# Patient Record
Sex: FEMALE
[De-identification: names, ages, dates, MRNs, and addresses within clinical notes are randomized per-mention and may not be internally consistent; named-entity substitution may affect disease eponyms.]

---

## 2018-01-09 ENCOUNTER — HOSPITAL ENCOUNTER (EMERGENCY)
Dept: HOSPITAL 14 - H.ER | Age: 23
Discharge: HOME | End: 2018-01-09
Payer: COMMERCIAL

## 2018-01-09 VITALS
TEMPERATURE: 96.7 F | DIASTOLIC BLOOD PRESSURE: 83 MMHG | OXYGEN SATURATION: 100 % | RESPIRATION RATE: 16 BRPM | SYSTOLIC BLOOD PRESSURE: 138 MMHG | HEART RATE: 60 BPM
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
## 2018-01-09 DIAGNOSIS — S09.0XXA: Primary | ICD-10-CM

## 2018-01-09 NOTE — ED PDOC
HPI:  Headache


Time Seen by Provider: 01/09/18 20:14


Chief Complaint (Nursing): Headache


Chief Complaint (Provider): headache


History Per: Patient


History/Exam Limitations: no limitations


Onset/Duration Of Symptoms: Days (1)


Current Symptoms Are (Timing): Still Present


Quality: Other (throbbing)


Additional History Per: Patient


Additional Complaint(s): 





23 y/o female presents with headache x 24 hours.  Patient states she was 

wearing special shoes for Spin class and when she walked on the tile she 

slipped and landed right on her buttocks.  Patient states she did not hit her 

head but felt it "rattle" when she hit, and notes generalized headaches since 

then.  Patient has been taking Tylenol every 4 hours without improvement of 

headache, and notes increased fatigue today.  Denies fever, dizziness, vision 

changes, neck/back pain, nausea/vomiting, extremity numbness/weakness. 





Past Medical History


Reviewed: Historical Data, Nursing Documentation, Vital Signs


Vital Signs: 





 Last Vital Signs











Temp  96.7 F L  01/09/18 19:49


 


Pulse  60   01/09/18 19:49


 


Resp  16   01/09/18 19:49


 


BP  138/83   01/09/18 19:49


 


Pulse Ox  100   01/09/18 19:49














- Medical History


PMH: No Chronic Diseases





- Surgical History


Surgical History: No Surg Hx





- Family History


Family History: States: No Known Family Hx





- Home Medications


Home Medications: 


 Ambulatory Orders











 Medication  Instructions  Recorded


 


Ibuprofen [Motrin Tab] 1 tab PO Q6 PRN #20 tab 01/09/18














- Allergies


Allergies/Adverse Reactions: 


 Allergies











Allergy/AdvReac Type Severity Reaction Status Date / Time


 


No Known Allergies Allergy   Verified 01/09/18 19:49














Review of Systems


ROS Statement: Except As Marked, All Systems Reviewed And Found Negative


Neurological: Positive for: Headache





Physical Exam





- Reviewed


Nursing Documentation Reviewed: Yes


Vital Signs Reviewed: Yes





- Physical Exam


Appears: Positive for: Well, Non-toxic, No Acute Distress


Head Exam: Positive for: ATRAUMATIC, NORMAL INSPECTION, NORMOCEPHALIC


Skin: Positive for: Normal Color


Eye Exam: Positive for: Normal appearance, EOMI, PERRL


ENT: Positive for: Normal ENT Inspection


Cardiovascular/Chest: Positive for: Regular Rate, Rhythm


Respiratory: Positive for: Normal Breath Sounds


Gastrointestinal/Abdominal: Positive for: Normal Exam


Back: Positive for: Normal Inspection


Extremity: Positive for: Normal ROM


Neurologic/Psych: Positive for: Alert, Oriented.  Negative for: Motor/Sensory 

Deficits





- ECG


O2 Sat by Pulse Oximetry: 100





- Progress


ED Course And Treament: 








EXAM:


CT Head Without Intravenous Contrast


EXAM DATE/TIME:


Exam ordered 1/9/2018 8:38 PM


CLINICAL HISTORY:


22 years old, female; Pain; Headache; Headache not specified; Additional info: 

Fall, headache


TECHNIQUE:


Axial computed tomography images of the head/brain without intravenous 

contrast. All CT scans at


this facility use one or more dose reduction techniques, viz.: automated 

exposure control; ma/kV


adjustment per patient size (including targeted exams where dose is matched to 

indication; i.e. head);


or iterative reconstruction technique.


Coronal and sagittal reformatted images were created and reviewed.


COMPARISON:


No relevant prior studies available.


FINDINGS:


Brain: Unremarkable. No hemorrhage. No significant white matter disease. No 

edema.


Ventricles: Unremarkable. No ventriculomegaly.


Bones/joints: Unremarkable. No acute fracture.


Soft tissues: Unremarkable.


Sinuses: Unremarkable as visualized. No acute sinusitis.


Mastoid air cells: Unremarkable as visualized. No mastoid effusion.


IMPRESSION:


Negative noncontrast head CT.








Patient educated on findings, discharged with rx Ibuprofen.


Advised follow up PMD 2-3 days.


Return precautions given.





Disposition





- Clinical Impression


Clinical Impression: 


 Headache








- Patient ED Disposition


Is Patient to be Admitted: No


Counseled Patient/Family Regarding: Studies Performed, Diagnosis, Need For 

Followup, Rx Given





- Disposition


Disposition: Routine/Home


Disposition Time: 22:08


Condition: STABLE


Prescriptions: 


Ibuprofen [Motrin Tab] 1 tab PO Q6 PRN #20 tab


 PRN Reason: Pain, Moderate (4-7)


Instructions:  Acute Headache (ED), Head Injury (ED)


Forms:  [facility] (English)

## 2018-01-10 NOTE — CT
PROCEDURE:  CT HEAD WITHOUT CONTRAST.



HISTORY:

fall, headache



COMPARISON:

None available. 



TECHNIQUE:

Axial computed tomography images were obtained through the head/brain 

without intravenous contrast.  



Radiation dose:



Total exam DLP = 823 mGy-cm.



This CT exam was performed using one or more of the following dose 

reduction techniques: Automated exposure control, adjustment of the 

mA and/or kV according to patient size, and/or use of iterative 

reconstruction technique.



FINDINGS:



HEMORRHAGE:

No intracranial hemorrhage. 



BRAIN:

No mass effect or edema.  No atrophy or chronic microvascular 

ischemic changes.



VENTRICLES:

Unremarkable. No hydrocephalus. 



CALVARIUM:

Unremarkable.



PARANASAL SINUSES:

Unremarkable as visualized. No significant inflammatory changes.



MASTOID AIR CELLS:

Unremarkable as visualized. No inflammatory changes.



OTHER FINDINGS:

None.



IMPRESSION:

Normal CT of the Head.  



Comments: Preliminary report provided by [facility]